# Patient Record
Sex: MALE | Race: BLACK OR AFRICAN AMERICAN | NOT HISPANIC OR LATINO | Employment: FULL TIME | URBAN - METROPOLITAN AREA
[De-identification: names, ages, dates, MRNs, and addresses within clinical notes are randomized per-mention and may not be internally consistent; named-entity substitution may affect disease eponyms.]

---

## 2021-06-15 ENCOUNTER — TELEPHONE (OUTPATIENT)
Dept: PSYCHIATRY | Facility: CLINIC | Age: 36
End: 2021-06-15

## 2021-06-15 ENCOUNTER — OFFICE VISIT (OUTPATIENT)
Dept: PRIMARY CARE CLINIC | Facility: CLINIC | Age: 36
End: 2021-06-15
Payer: MEDICAID

## 2021-06-15 VITALS
SYSTOLIC BLOOD PRESSURE: 118 MMHG | TEMPERATURE: 98 F | HEART RATE: 78 BPM | RESPIRATION RATE: 18 BRPM | DIASTOLIC BLOOD PRESSURE: 76 MMHG | WEIGHT: 258.94 LBS | OXYGEN SATURATION: 97 % | BODY MASS INDEX: 34.32 KG/M2 | HEIGHT: 73 IN

## 2021-06-15 DIAGNOSIS — Z23 NEED FOR VACCINATION: ICD-10-CM

## 2021-06-15 DIAGNOSIS — Z76.89 ENCOUNTER TO ESTABLISH CARE: Primary | ICD-10-CM

## 2021-06-15 DIAGNOSIS — Z11.4 SCREENING FOR HIV (HUMAN IMMUNODEFICIENCY VIRUS): ICD-10-CM

## 2021-06-15 DIAGNOSIS — Z13.220 SCREENING FOR HYPERLIPIDEMIA: ICD-10-CM

## 2021-06-15 DIAGNOSIS — Z11.59 NEED FOR HEPATITIS C SCREENING TEST: ICD-10-CM

## 2021-06-15 DIAGNOSIS — Z70.9 SEXUAL COUNSELING: ICD-10-CM

## 2021-06-15 PROCEDURE — 99215 OFFICE O/P EST HI 40 MIN: CPT | Mod: PBBFAC,PN,25 | Performed by: STUDENT IN AN ORGANIZED HEALTH CARE EDUCATION/TRAINING PROGRAM

## 2021-06-15 PROCEDURE — 99999 PR PBB SHADOW E&M-EST. PATIENT-LVL V: CPT | Mod: PBBFAC,,, | Performed by: STUDENT IN AN ORGANIZED HEALTH CARE EDUCATION/TRAINING PROGRAM

## 2021-06-15 PROCEDURE — 90471 IMMUNIZATION ADMIN: CPT | Mod: PBBFAC,PN

## 2021-06-15 PROCEDURE — 99203 PR OFFICE/OUTPT VISIT, NEW, LEVL III, 30-44 MIN: ICD-10-PCS | Mod: S$PBB,,, | Performed by: STUDENT IN AN ORGANIZED HEALTH CARE EDUCATION/TRAINING PROGRAM

## 2021-06-15 PROCEDURE — 99203 OFFICE O/P NEW LOW 30 MIN: CPT | Mod: S$PBB,,, | Performed by: STUDENT IN AN ORGANIZED HEALTH CARE EDUCATION/TRAINING PROGRAM

## 2021-06-15 PROCEDURE — 99999 PR PBB SHADOW E&M-EST. PATIENT-LVL V: ICD-10-PCS | Mod: PBBFAC,,, | Performed by: STUDENT IN AN ORGANIZED HEALTH CARE EDUCATION/TRAINING PROGRAM

## 2021-08-02 ENCOUNTER — TELEPHONE (OUTPATIENT)
Dept: ENDOCRINOLOGY | Facility: CLINIC | Age: 36
End: 2021-08-02

## 2021-08-02 ENCOUNTER — OFFICE VISIT (OUTPATIENT)
Dept: ENDOCRINOLOGY | Facility: CLINIC | Age: 36
End: 2021-08-02
Payer: MEDICAID

## 2021-08-02 DIAGNOSIS — Z70.9 SEXUAL COUNSELING: ICD-10-CM

## 2021-08-02 PROCEDURE — 99204 PR OFFICE/OUTPT VISIT, NEW, LEVL IV, 45-59 MIN: ICD-10-PCS | Mod: 95,KX,, | Performed by: INTERNAL MEDICINE

## 2021-08-02 PROCEDURE — 99204 OFFICE O/P NEW MOD 45 MIN: CPT | Mod: 95,KX,, | Performed by: INTERNAL MEDICINE

## 2021-08-02 RX ORDER — SPIRONOLACTONE 50 MG/1
50 TABLET, FILM COATED ORAL 2 TIMES DAILY
Qty: 60 TABLET | Refills: 5 | Status: SHIPPED | OUTPATIENT
Start: 2021-08-02 | End: 2021-10-08 | Stop reason: SDUPTHER

## 2021-08-02 RX ORDER — ESTRADIOL VALERATE 40 MG/ML
10 INJECTION INTRAMUSCULAR
Qty: 5 ML | Refills: 5 | Status: SHIPPED | OUTPATIENT
Start: 2021-08-02 | End: 2021-10-08 | Stop reason: SDUPTHER

## 2021-08-08 ENCOUNTER — PATIENT MESSAGE (OUTPATIENT)
Dept: ENDOCRINOLOGY | Facility: CLINIC | Age: 36
End: 2021-08-08

## 2021-08-10 RX ORDER — SILDENAFIL 50 MG/1
50 TABLET, FILM COATED ORAL
Qty: 10 TABLET | Refills: 1 | Status: SHIPPED | OUTPATIENT
Start: 2021-08-10 | End: 2022-08-10

## 2021-08-11 ENCOUNTER — TELEPHONE (OUTPATIENT)
Dept: ENDOCRINOLOGY | Facility: CLINIC | Age: 36
End: 2021-08-11

## 2021-08-13 ENCOUNTER — TELEPHONE (OUTPATIENT)
Dept: ENDOCRINOLOGY | Facility: CLINIC | Age: 36
End: 2021-08-13

## 2021-10-11 RX ORDER — ESTRADIOL VALERATE 40 MG/ML
10 INJECTION INTRAMUSCULAR
Qty: 5 ML | Refills: 5 | Status: SHIPPED | OUTPATIENT
Start: 2021-10-11 | End: 2021-12-12 | Stop reason: SDUPTHER

## 2021-10-11 RX ORDER — SPIRONOLACTONE 50 MG/1
50 TABLET, FILM COATED ORAL 2 TIMES DAILY
Qty: 60 TABLET | Refills: 5 | Status: SHIPPED | OUTPATIENT
Start: 2021-10-11 | End: 2021-12-12 | Stop reason: SDUPTHER

## 2021-12-13 RX ORDER — ESTRADIOL VALERATE 40 MG/ML
10 INJECTION INTRAMUSCULAR
Qty: 5 ML | Refills: 5 | Status: SHIPPED | OUTPATIENT
Start: 2021-12-13 | End: 2022-05-26 | Stop reason: SDUPTHER

## 2021-12-13 RX ORDER — SPIRONOLACTONE 50 MG/1
50 TABLET, FILM COATED ORAL 2 TIMES DAILY
Qty: 60 TABLET | Refills: 5 | Status: SHIPPED | OUTPATIENT
Start: 2021-12-13 | End: 2022-05-26 | Stop reason: SDUPTHER

## 2022-05-26 RX ORDER — SPIRONOLACTONE 50 MG/1
50 TABLET, FILM COATED ORAL 2 TIMES DAILY
Qty: 60 TABLET | Refills: 3 | Status: SHIPPED | OUTPATIENT
Start: 2022-05-26 | End: 2022-05-30 | Stop reason: SDUPTHER

## 2022-05-26 RX ORDER — ESTRADIOL VALERATE 40 MG/ML
10 INJECTION INTRAMUSCULAR
Qty: 5 ML | Refills: 3 | Status: SHIPPED | OUTPATIENT
Start: 2022-05-26 | End: 2022-05-30 | Stop reason: SDUPTHER

## 2022-05-26 RX ORDER — ESTRADIOL VALERATE 40 MG/ML
10 INJECTION INTRAMUSCULAR
Qty: 5 ML | Refills: 3 | Status: SHIPPED | OUTPATIENT
Start: 2022-05-26 | End: 2022-05-26 | Stop reason: SDUPTHER

## 2022-05-26 RX ORDER — SPIRONOLACTONE 50 MG/1
50 TABLET, FILM COATED ORAL 2 TIMES DAILY
Qty: 60 TABLET | Refills: 3 | Status: SHIPPED | OUTPATIENT
Start: 2022-05-26 | End: 2022-05-26 | Stop reason: SDUPTHER

## 2022-05-28 ENCOUNTER — PATIENT MESSAGE (OUTPATIENT)
Dept: ENDOCRINOLOGY | Facility: CLINIC | Age: 37
End: 2022-05-28
Payer: MEDICAID

## 2022-05-30 ENCOUNTER — PATIENT MESSAGE (OUTPATIENT)
Dept: ENDOCRINOLOGY | Facility: CLINIC | Age: 37
End: 2022-05-30
Payer: MEDICAID

## 2022-05-30 RX ORDER — SPIRONOLACTONE 50 MG/1
50 TABLET, FILM COATED ORAL 2 TIMES DAILY
Qty: 180 TABLET | Refills: 0 | Status: SHIPPED | OUTPATIENT
Start: 2022-05-30 | End: 2022-09-06

## 2022-05-30 RX ORDER — ESTRADIOL VALERATE 40 MG/ML
10 INJECTION INTRAMUSCULAR
Qty: 5 ML | Refills: 1 | Status: SHIPPED | OUTPATIENT
Start: 2022-05-30 | End: 2023-02-08 | Stop reason: SDUPTHER

## 2022-06-03 NOTE — TELEPHONE ENCOUNTER
----- Message from Aylin Al sent at 6/3/2022  2:46 PM CDT -----  Regarding: Refill  Contact: pt @[phone]  estradiol valerate (DELESTROGEN) 40 mg/mL injection    spironolactone (ALDACTONE) 50 MG tablet    Pt checking to see if office refilled medication, asking for a call back

## 2022-06-04 RX ORDER — SPIRONOLACTONE 50 MG/1
50 TABLET, FILM COATED ORAL 2 TIMES DAILY
Qty: 180 TABLET | Refills: 0 | OUTPATIENT
Start: 2022-06-04

## 2022-06-04 RX ORDER — ESTRADIOL VALERATE 40 MG/ML
10 INJECTION INTRAMUSCULAR
Qty: 5 ML | Refills: 0 | OUTPATIENT
Start: 2022-06-04 | End: 2023-06-04

## 2022-07-21 ENCOUNTER — PATIENT MESSAGE (OUTPATIENT)
Dept: ENDOCRINOLOGY | Facility: CLINIC | Age: 37
End: 2022-07-21
Payer: MEDICAID

## 2022-07-22 ENCOUNTER — OFFICE VISIT (OUTPATIENT)
Dept: ENDOCRINOLOGY | Facility: CLINIC | Age: 37
End: 2022-07-22
Attending: INTERNAL MEDICINE
Payer: MEDICAID

## 2022-07-22 PROCEDURE — 99214 OFFICE O/P EST MOD 30 MIN: CPT | Mod: 95,KX,, | Performed by: INTERNAL MEDICINE

## 2022-07-22 PROCEDURE — 1160F RVW MEDS BY RX/DR IN RCRD: CPT | Mod: CPTII,95,, | Performed by: INTERNAL MEDICINE

## 2022-07-22 PROCEDURE — 1159F MED LIST DOCD IN RCRD: CPT | Mod: CPTII,95,, | Performed by: INTERNAL MEDICINE

## 2022-07-22 PROCEDURE — 99214 PR OFFICE/OUTPT VISIT, EST, LEVL IV, 30-39 MIN: ICD-10-PCS | Mod: 95,KX,, | Performed by: INTERNAL MEDICINE

## 2022-07-22 PROCEDURE — 1159F PR MEDICATION LIST DOCUMENTED IN MEDICAL RECORD: ICD-10-PCS | Mod: CPTII,95,, | Performed by: INTERNAL MEDICINE

## 2022-07-22 PROCEDURE — 1160F PR REVIEW ALL MEDS BY PRESCRIBER/CLIN PHARMACIST DOCUMENTED: ICD-10-PCS | Mod: CPTII,95,, | Performed by: INTERNAL MEDICINE

## 2022-07-22 NOTE — PATIENT INSTRUCTIONS
Thank you for completing a virtual visit with me!     Per our conversation:   Continue with your current regimen.  My nurse will reach out to arrange for blood work to be done in 3 months and a visit after.  Remember to have the blood work done 1-2 days prior to your injection     Please let me know if you have any other questions.    Thank you,  Reba Stanley MD

## 2022-07-22 NOTE — ASSESSMENT & PLAN NOTE
Transwoman: gender incongruence   Reviewed therapy, side effects (both wanted and unwanted), possible adverse outcomes, expectations, compliance. Reviewed limited data on fertility, expect decline over time     Patient encouraged to  Work with a therapist during the transition process.          Since she just restarted her regimen.... RTO in 3 months with labs with labs prior.    Healthy lifestyle stressed  Discussed increased risk of dvt   Avoid prolonged immobility  D/c ert prior to any procedures

## 2022-07-22 NOTE — PROGRESS NOTES
Subjective:      Patient ID: Charly Anderson is a 36 y.o. male.    Chief Complaint:  Gender     History of Present Illness  With regards to her gender incongruence care:    Gender identity - female  Current Therapy / counselor - none   Therapist at the time hormones were started:  None        Gender Surgeries - none, but interested contouring surgeries (fat transfer)        Fertility concerns - not  interested    Was  using her friend's   hormone therapy and I started therapy in August of 2022:  Current regimen   Estradiol valerate  10 mg q 1 week - fridays     aldactone 50 mg bid        Of note, she moved to [place] but is now back.  She was offer hormones for a little while.  She has been back on hormones for 2 weeks.      viagra prn - didn't use it     Getting breast development   She is happy she is doing this   She feels much better on hormones then not    Social:  Work -looking for a job   Family -  Not aware   Single - attracted to women - erections are not important   Housing - lives alone       no dx of Anxiety or depression    New concerns today:   None        With regards to obesity,     Denies symptoms of hyperglycemia such as polyuria, polydipsia, nocturia, unexplained weight loss or blurred vision      ROS:   As above    Objective:     There were no vitals taken for this visit.  BP Readings from Last 3 Encounters:   06/15/21 118/76     Wt Readings from Last 1 Encounters:   06/15/21 0925 117.4 kg (258 lb 14.9 oz)     There is no height or weight on file to calculate BMI.        Physical Exam  Constitutional:       Appearance: Normal appearance.   Psychiatric:         Mood and Affect: Mood normal.         Behavior: Behavior normal.         Thought Content: Thought content normal.         Judgment: Judgment normal.                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 Reviewed limited data on fertility, expect decline over time     Patient encouraged to  Work with a therapist during the transition process.          Since she just restarted her regimen.... RTO in 3 months with labs with labs prior.    Healthy lifestyle stressed  Discussed increased risk of dvt   Avoid prolonged immobility  D/c ert prior to any procedures         BMI 34.0-34.9,adult  Check glucose lipids and A1c  Follow over time  There is increased risk for developing diabetes      The patient location is: home  The chief complaint leading to consultation is: gender    Visit type: audiovisual    Face to Face time with patient: 25 minutes of total time spent on the encounter, which includes face to face time and non-face to face time preparing to see the patient (eg, review of tests), Obtaining and/or reviewing separately obtained history, Documenting clinical information in the electronic or other health record, Independently interpreting results (not separately reported) and communicating results to the patient/family/caregiver, or Care coordination (not separately reported).         Each patient to whom he or she provides medical services by telemedicine is:  (1) informed of the relationship between the physician and patient and the respective role of any other health care provider with respect to management of the patient; and (2) notified that he or she may decline to receive medical services by telemedicine and may withdraw from such care at any time.

## 2022-11-15 ENCOUNTER — TELEPHONE (OUTPATIENT)
Dept: ENDOCRINOLOGY | Facility: CLINIC | Age: 37
End: 2022-11-15
Payer: MEDICAID

## 2022-11-15 NOTE — TELEPHONE ENCOUNTER
Patient wants to discuss getting breast implants. Pt has medicaid and wants to know the process needed to get surgery.      ----- Message from Annabella Holloway sent at 11/15/2022  2:21 PM CST -----  Regarding: Pt Advice  Pt is requesting a call back regarding Breast Implants Discussion   Please call to discuss further.      [phone]

## 2022-11-16 NOTE — TELEPHONE ENCOUNTER
Informed pt what Dr. Stanley stated about letter of support and informed pt that she needs to speak to surgeons to see if they accept insurance.

## 2023-01-19 ENCOUNTER — TELEPHONE (OUTPATIENT)
Dept: ENDOCRINOLOGY | Facility: CLINIC | Age: 38
End: 2023-01-19
Payer: MEDICAID

## 2023-01-19 NOTE — TELEPHONE ENCOUNTER
----- Message from Ghada Saucedo sent at 1/19/2023  3:52 PM CST -----  Contact: pt  Pt is requesting to schedule an appt for a check up and also to see about increasing the hormones that are being taken. No appts populate in the system for me to assist with scheduling appt.     Confirmed contact below:  Contact Name:Charly Anderson  Phone Number: [phone]

## 2023-01-19 NOTE — TELEPHONE ENCOUNTER
Attempted to call the patient to get scheduled for an appt. No response. Called patient x2. Message sent to supervisor to get patient an appt sooner than May 2023

## 2023-01-20 ENCOUNTER — PATIENT MESSAGE (OUTPATIENT)
Dept: ENDOCRINOLOGY | Facility: CLINIC | Age: 38
End: 2023-01-20
Payer: MEDICAID

## 2023-01-20 NOTE — TELEPHONE ENCOUNTER
Attempted to call patient again. No answer. Patient portal message sent informing pt of the scheduled appointment and asking for confirmation if the appointment date and time will work for pt's schedule.

## 2023-02-03 DIAGNOSIS — Z78.9 TRANSGENDER: ICD-10-CM

## 2023-02-03 RX ORDER — SPIRONOLACTONE 50 MG/1
50 TABLET, FILM COATED ORAL 2 TIMES DAILY
Qty: 180 TABLET | Refills: 0 | Status: SHIPPED | OUTPATIENT
Start: 2023-02-03 | End: 2023-02-08 | Stop reason: SDUPTHER

## 2023-02-03 NOTE — TELEPHONE ENCOUNTER
----- Message from Annabella Holloway sent at 2/3/2023  3:13 PM CST -----  Regarding: Medication  Contact: Pt@[phone]  Pt is Requesting a call back regarding Medication Refill Request  spironolactone (ALDACTONE) 50 MG tablet,please call to discuss further.                [facility] [id number] [facility] [place], [place] - [id number] [place] [id number]  Phone: [phone] Fax: [phone]

## 2023-02-08 ENCOUNTER — PATIENT MESSAGE (OUTPATIENT)
Dept: ENDOCRINOLOGY | Facility: CLINIC | Age: 38
End: 2023-02-08
Payer: MEDICAID

## 2023-02-08 DIAGNOSIS — Z78.9 TRANSGENDER: ICD-10-CM

## 2023-02-08 RX ORDER — ESTRADIOL VALERATE 40 MG/ML
10 INJECTION INTRAMUSCULAR
Qty: 5 ML | Refills: 1 | Status: SHIPPED | OUTPATIENT
Start: 2023-02-08 | End: 2023-05-17 | Stop reason: SDUPTHER

## 2023-02-08 RX ORDER — SPIRONOLACTONE 50 MG/1
50 TABLET, FILM COATED ORAL 2 TIMES DAILY
Qty: 180 TABLET | Refills: 0 | Status: SHIPPED | OUTPATIENT
Start: 2023-02-08 | End: 2023-05-17 | Stop reason: SDUPTHER

## 2023-02-28 ENCOUNTER — PATIENT MESSAGE (OUTPATIENT)
Dept: ENDOCRINOLOGY | Facility: CLINIC | Age: 38
End: 2023-02-28
Payer: MEDICAID

## 2023-02-28 RX ORDER — NEEDLES, SAFETY 22GX1 1/2"
NEEDLE, DISPOSABLE MISCELLANEOUS
Qty: 100 EACH | Refills: 5 | Status: SHIPPED | OUTPATIENT
Start: 2023-02-28 | End: 2023-03-03 | Stop reason: SDUPTHER

## 2023-02-28 RX ORDER — NEEDLES, DISPOSABLE 27GX1/2"
NEEDLE, DISPOSABLE MISCELLANEOUS
Qty: 100 EACH | Refills: 1 | Status: SHIPPED | OUTPATIENT
Start: 2023-02-28 | End: 2023-03-03 | Stop reason: SDUPTHER

## 2023-03-02 ENCOUNTER — PATIENT MESSAGE (OUTPATIENT)
Dept: ENDOCRINOLOGY | Facility: CLINIC | Age: 38
End: 2023-03-02
Payer: MEDICAID

## 2023-03-02 DIAGNOSIS — Z78.9 TRANSGENDER: Primary | ICD-10-CM

## 2023-03-03 RX ORDER — NEEDLES, SAFETY 22GX1 1/2"
NEEDLE, DISPOSABLE MISCELLANEOUS
Qty: 100 EACH | Refills: 5 | Status: SHIPPED | OUTPATIENT
Start: 2023-03-03 | End: 2023-05-17 | Stop reason: SDUPTHER

## 2023-03-03 RX ORDER — NEEDLES, DISPOSABLE 27GX1/2"
NEEDLE, DISPOSABLE MISCELLANEOUS
Qty: 100 EACH | Refills: 1 | Status: SHIPPED | OUTPATIENT
Start: 2023-03-03 | End: 2023-05-17 | Stop reason: SDUPTHER

## 2023-04-18 ENCOUNTER — PATIENT MESSAGE (OUTPATIENT)
Dept: ENDOCRINOLOGY | Facility: CLINIC | Age: 38
End: 2023-04-18
Payer: MEDICAID

## 2023-05-17 ENCOUNTER — PATIENT MESSAGE (OUTPATIENT)
Dept: ENDOCRINOLOGY | Facility: CLINIC | Age: 38
End: 2023-05-17
Payer: MEDICAID

## 2023-05-17 DIAGNOSIS — Z78.9 TRANSGENDER: ICD-10-CM

## 2023-05-19 RX ORDER — NEEDLES, SAFETY 22GX1 1/2"
NEEDLE, DISPOSABLE MISCELLANEOUS
Qty: 100 EACH | Refills: 5 | Status: SHIPPED | OUTPATIENT
Start: 2023-05-19 | End: 2024-01-03 | Stop reason: SDUPTHER

## 2023-05-19 RX ORDER — SPIRONOLACTONE 50 MG/1
50 TABLET, FILM COATED ORAL 2 TIMES DAILY
Qty: 180 TABLET | Refills: 0 | Status: SHIPPED | OUTPATIENT
Start: 2023-05-19 | End: 2023-09-28 | Stop reason: SDUPTHER

## 2023-05-19 RX ORDER — NEEDLES, DISPOSABLE 27GX1/2"
NEEDLE, DISPOSABLE MISCELLANEOUS
Qty: 100 EACH | Refills: 1 | Status: SHIPPED | OUTPATIENT
Start: 2023-05-19

## 2023-05-19 RX ORDER — ESTRADIOL VALERATE 40 MG/ML
10 INJECTION INTRAMUSCULAR
Qty: 5 ML | Refills: 1 | Status: SHIPPED | OUTPATIENT
Start: 2023-05-19 | End: 2023-09-28 | Stop reason: SDUPTHER

## 2023-09-28 ENCOUNTER — OFFICE VISIT (OUTPATIENT)
Dept: ENDOCRINOLOGY | Facility: CLINIC | Age: 38
End: 2023-09-28
Payer: MEDICAID

## 2023-09-28 DIAGNOSIS — F64.0 TRANSGENDER WOMAN ON HORMONE THERAPY: Primary | ICD-10-CM

## 2023-09-28 DIAGNOSIS — Z78.9 TRANSGENDER: ICD-10-CM

## 2023-09-28 DIAGNOSIS — Z79.899 TRANSGENDER WOMAN ON HORMONE THERAPY: Primary | ICD-10-CM

## 2023-09-28 PROCEDURE — 1160F PR REVIEW ALL MEDS BY PRESCRIBER/CLIN PHARMACIST DOCUMENTED: ICD-10-PCS | Mod: CPTII,95,, | Performed by: INTERNAL MEDICINE

## 2023-09-28 PROCEDURE — 1159F PR MEDICATION LIST DOCUMENTED IN MEDICAL RECORD: ICD-10-PCS | Mod: CPTII,95,, | Performed by: INTERNAL MEDICINE

## 2023-09-28 PROCEDURE — 99214 PR OFFICE/OUTPT VISIT, EST, LEVL IV, 30-39 MIN: ICD-10-PCS | Mod: 95,,, | Performed by: INTERNAL MEDICINE

## 2023-09-28 PROCEDURE — 99214 OFFICE O/P EST MOD 30 MIN: CPT | Mod: 95,,, | Performed by: INTERNAL MEDICINE

## 2023-09-28 PROCEDURE — 1160F RVW MEDS BY RX/DR IN RCRD: CPT | Mod: CPTII,95,, | Performed by: INTERNAL MEDICINE

## 2023-09-28 PROCEDURE — 1159F MED LIST DOCD IN RCRD: CPT | Mod: CPTII,95,, | Performed by: INTERNAL MEDICINE

## 2023-09-28 RX ORDER — SPIRONOLACTONE 50 MG/1
50 TABLET, FILM COATED ORAL 2 TIMES DAILY
Qty: 180 TABLET | Refills: 3 | Status: SHIPPED | OUTPATIENT
Start: 2023-09-28 | End: 2024-01-03 | Stop reason: SDUPTHER

## 2023-09-28 RX ORDER — ESTRADIOL VALERATE 40 MG/ML
10 INJECTION INTRAMUSCULAR
Qty: 5 ML | Refills: 1 | Status: SHIPPED | OUTPATIENT
Start: 2023-09-28 | End: 2024-01-03 | Stop reason: SDUPTHER

## 2023-09-28 NOTE — ASSESSMENT & PLAN NOTE
Transwoman: gender incongruence   Reviewed therapy, side effects (both wanted and unwanted), possible adverse outcomes, expectations, compliance. Reviewed limited data on fertility, expect decline over time     Patient encouraged to  Work with a therapist during the transition process.           restart estrogen, labs in a few weeks.    If okay return to clinic yearly    Healthy lifestyle stressed  Discussed increased risk of dvt   Avoid prolonged immobility  D/c ert prior to any procedures

## 2023-09-28 NOTE — Clinical Note
Fasting labs soon.  Please have labs done 1-2 days prior to estrogen injection.   Return to clinic in a year

## 2023-09-28 NOTE — PATIENT INSTRUCTIONS
Thank you for completing a virtual visit with me!     Per our conversation,  I will send in a new prescriptions for spironolactone and estrogen.  My nurse will reach out to arrange for fasting blood work to be done soon.  Remember to have the labs drawn 1-2 days prior to your estrogen injection.        If all looks okay, we will see you back in a year       Please let me know if you have any other questions.    Thank you,  Reba Stanley MD

## 2023-09-28 NOTE — PROGRESS NOTES
"Subjective:      Patient ID: Charly Anderson is a 37 y.o. adult.    Chief Complaint:  Gender     History of Present Illness  With regards to her gender incongruence care:    Gender identity - female  Current Therapy / counselor - none   Therapist at the time hormones were started:  None        Gender Surgeries - none, but interested contouring surgeries (fat transfer)    BA in [place] in the next few months        Fertility concerns - not  interested    Was  using her friend's   hormone therapy and I started therapy in August of 2022:  Current regimen   Estradiol valerate  10 mg q 1 week - - out for a month    aldactone 50 mg bid         Getting breast development   She is happy she is doing this   She feels much better on hormones then not    Social:  Work -   Family -  Not aware   Single - attracted to women / transmen - erections are not important   Housing - lives alone       no dx of Anxiety or depression    New concerns today:   None        With regards to obesity,   and low hdl  Denies symptoms of hyperglycemia such as polyuria, polydipsia, nocturia, unexplained weight loss or blurred vision    Wt 285   6"1'      ROS:   As above    Objective:     There were no vitals taken for this visit.  BP Readings from Last 3 Encounters:   06/15/21 118/76     Wt Readings from Last 1 Encounters:   06/15/21 0925 117.4 kg (258 lb 14.9 oz)     There is no height or weight on file to calculate BMI.        Physical Exam  Constitutional:       Appearance: Normal appearance.   Psychiatric:         Mood and Affect: Mood normal.         Behavior: Behavior normal.         Thought Content: Thought content normal.         Judgment: Judgment normal.                Lab Review:   No results found for: "HGBA1C"  Lab Results   Component Value Date    CHOL 146 06/15/2021    HDL 36 (L) 06/15/2021    LDLCALC 95.8 06/15/2021    TRIG 71 06/15/2021    CHOLHDL 24.7 06/15/2021     No results found for: "NA", "K", "CL", "CO2", " ""GLU", "BUN", "CREATININE", "CALCIUM", "PROT", "ALBUMIN", "BILITOT", "ALKPHOS", "AST", "ALT", "ANIONGAP", "ESTGFRAFRICA", "EGFRNONAA", "TSH"  No results found for: "HBXOITYE10ED"    Assessment and Plan     Transgender woman on hormone therapy  Transwoman: gender incongruence   Reviewed therapy, side effects (both wanted and unwanted), possible adverse outcomes, expectations, compliance. Reviewed limited data on fertility, expect decline over time     Patient encouraged to  Work with a therapist during the transition process.           restart estrogen, labs in a few weeks.    If okay return to clinic yearly    Healthy lifestyle stressed  Discussed increased risk of dvt   Avoid prolonged immobility  D/c ert prior to any procedures         BMI 34.0-34.9,adult  Check glucose lipids and A1c  Follow over time  There is increased risk for developing diabetes      The patient location is: home  The chief complaint leading to consultation is: gender    Visit type: audiovisual    Face to Face time with patient: 25 minutes of total time spent on the encounter, which includes face to face time and non-face to face time preparing to see the patient (eg, review of tests), Obtaining and/or reviewing separately obtained history, Documenting clinical information in the electronic or other health record, Independently interpreting results (not separately reported) and communicating results to the patient/family/caregiver, or Care coordination (not separately reported).         Each patient to whom he or she provides medical services by telemedicine is:  (1) informed of the relationship between the physician and patient and the respective role of any other health care provider with respect to management of the patient; and (2) notified that he or she may decline to receive medical services by telemedicine and may withdraw from such care at any time.     "

## 2023-10-04 ENCOUNTER — LAB VISIT (OUTPATIENT)
Dept: LAB | Facility: OTHER | Age: 38
End: 2023-10-04
Attending: INTERNAL MEDICINE
Payer: MEDICAID

## 2023-10-04 DIAGNOSIS — F64.0 TRANSGENDER WOMAN ON HORMONE THERAPY: ICD-10-CM

## 2023-10-04 DIAGNOSIS — Z79.899 TRANSGENDER WOMAN ON HORMONE THERAPY: ICD-10-CM

## 2023-10-04 LAB
25(OH)D3+25(OH)D2 SERPL-MCNC: 6 NG/ML (ref 30–96)
ALBUMIN SERPL BCP-MCNC: 4.3 G/DL (ref 3.5–5.2)
ALP SERPL-CCNC: 69 U/L (ref 55–135)
ALT SERPL W/O P-5'-P-CCNC: 19 U/L (ref 10–44)
ANION GAP SERPL CALC-SCNC: 9 MMOL/L (ref 8–16)
AST SERPL-CCNC: 18 U/L (ref 10–40)
BILIRUB SERPL-MCNC: 0.2 MG/DL (ref 0.1–1)
BUN SERPL-MCNC: 12 MG/DL (ref 6–20)
CALCIUM SERPL-MCNC: 9.8 MG/DL (ref 8.7–10.5)
CHLORIDE SERPL-SCNC: 107 MMOL/L (ref 95–110)
CHOLEST SERPL-MCNC: 145 MG/DL (ref 120–199)
CHOLEST/HDLC SERPL: 4.4 {RATIO} (ref 2–5)
CO2 SERPL-SCNC: 23 MMOL/L (ref 23–29)
CREAT SERPL-MCNC: 1.3 MG/DL (ref 0.5–1.4)
EST. GFR  (NO RACE VARIABLE): >60 ML/MIN/1.73 M^2
ESTIMATED AVG GLUCOSE: 117 MG/DL (ref 68–131)
ESTRADIOL SERPL-MCNC: 35 PG/ML (ref 11–44)
GLUCOSE SERPL-MCNC: 104 MG/DL (ref 70–110)
HBA1C MFR BLD: 5.7 % (ref 4–5.6)
HDLC SERPL-MCNC: 33 MG/DL (ref 40–75)
HDLC SERPL: 22.8 % (ref 20–50)
LDLC SERPL CALC-MCNC: 91 MG/DL (ref 63–159)
NONHDLC SERPL-MCNC: 112 MG/DL
POTASSIUM SERPL-SCNC: 4.2 MMOL/L (ref 3.5–5.1)
PROT SERPL-MCNC: 8.2 G/DL (ref 6–8.4)
SODIUM SERPL-SCNC: 139 MMOL/L (ref 136–145)
TESTOST SERPL-MCNC: 348 NG/DL (ref 304–1227)
TRIGL SERPL-MCNC: 105 MG/DL (ref 30–150)
TSH SERPL DL<=0.005 MIU/L-ACNC: 0.63 UIU/ML (ref 0.4–4)

## 2023-10-04 PROCEDURE — 83036 HEMOGLOBIN GLYCOSYLATED A1C: CPT | Performed by: INTERNAL MEDICINE

## 2023-10-04 PROCEDURE — 84403 ASSAY OF TOTAL TESTOSTERONE: CPT | Performed by: INTERNAL MEDICINE

## 2023-10-04 PROCEDURE — 36415 COLL VENOUS BLD VENIPUNCTURE: CPT | Performed by: INTERNAL MEDICINE

## 2023-10-04 PROCEDURE — 82306 VITAMIN D 25 HYDROXY: CPT | Performed by: INTERNAL MEDICINE

## 2023-10-04 PROCEDURE — 80061 LIPID PANEL: CPT | Performed by: INTERNAL MEDICINE

## 2023-10-04 PROCEDURE — 82670 ASSAY OF TOTAL ESTRADIOL: CPT | Performed by: INTERNAL MEDICINE

## 2023-10-04 PROCEDURE — 80053 COMPREHEN METABOLIC PANEL: CPT | Performed by: INTERNAL MEDICINE

## 2023-10-04 PROCEDURE — 84443 ASSAY THYROID STIM HORMONE: CPT | Performed by: INTERNAL MEDICINE

## 2023-10-10 ENCOUNTER — PATIENT MESSAGE (OUTPATIENT)
Dept: ENDOCRINOLOGY | Facility: CLINIC | Age: 38
End: 2023-10-10
Payer: MEDICAID

## 2023-10-10 DIAGNOSIS — R73.02 IGT (IMPAIRED GLUCOSE TOLERANCE): ICD-10-CM

## 2023-10-10 DIAGNOSIS — E55.9 VITAMIN D DEFICIENCY: ICD-10-CM

## 2023-10-10 DIAGNOSIS — E78.6 LOW HDL (UNDER 40): ICD-10-CM

## 2023-10-10 DIAGNOSIS — Z79.899 TRANSGENDER WOMAN ON HORMONE THERAPY: Primary | ICD-10-CM

## 2023-10-10 DIAGNOSIS — F64.0 TRANSGENDER WOMAN ON HORMONE THERAPY: Primary | ICD-10-CM

## 2024-01-03 DIAGNOSIS — Z78.9 TRANSGENDER: Primary | ICD-10-CM

## 2024-01-04 RX ORDER — SPIRONOLACTONE 50 MG/1
50 TABLET, FILM COATED ORAL 2 TIMES DAILY
Qty: 180 TABLET | Refills: 3 | Status: SHIPPED | OUTPATIENT
Start: 2024-01-04

## 2024-01-04 RX ORDER — ESTRADIOL VALERATE 40 MG/ML
10 INJECTION INTRAMUSCULAR
Qty: 5 ML | Refills: 1 | Status: SHIPPED | OUTPATIENT
Start: 2024-01-04 | End: 2025-01-03

## 2024-01-04 RX ORDER — NEEDLES, SAFETY 22GX1 1/2"
NEEDLE, DISPOSABLE MISCELLANEOUS
Qty: 100 EACH | Refills: 5 | Status: SHIPPED | OUTPATIENT
Start: 2024-01-04

## 2024-04-03 ENCOUNTER — PATIENT MESSAGE (OUTPATIENT)
Dept: RESEARCH | Facility: CLINIC | Age: 39
End: 2024-04-03
Payer: MEDICAID

## 2024-04-04 ENCOUNTER — PATIENT MESSAGE (OUTPATIENT)
Dept: RESEARCH | Facility: CLINIC | Age: 39
End: 2024-04-04
Payer: MEDICAID

## 2024-04-22 ENCOUNTER — PATIENT MESSAGE (OUTPATIENT)
Dept: RESEARCH | Facility: CLINIC | Age: 39
End: 2024-04-22
Payer: MEDICAID

## 2024-04-25 ENCOUNTER — PATIENT OUTREACH (OUTPATIENT)
Dept: ADMINISTRATIVE | Facility: HOSPITAL | Age: 39
End: 2024-04-25
Payer: MEDICAID

## 2024-04-25 ENCOUNTER — PATIENT MESSAGE (OUTPATIENT)
Dept: ADMINISTRATIVE | Facility: HOSPITAL | Age: 39
End: 2024-04-25
Payer: MEDICAID

## 2024-04-25 NOTE — PROGRESS NOTES
Health Maintenance Due   Topic Date Due    Influenza Vaccine (1) Never done    COVID-19 Vaccine (1 - 2023-24 season) Never done     Immunizations - reviewed and updated   Care Everywhere - triggered   Care Teams - updated   Outreach - [facility] panel list reviewed. Patient due for annual visit with PCP. Patient last seen on 6/15/2021 to establish care. Portal message sent in regards to scheduling

## 2024-06-04 DIAGNOSIS — Z78.9 TRANSGENDER: ICD-10-CM

## 2024-06-05 RX ORDER — SPIRONOLACTONE 50 MG/1
50 TABLET, FILM COATED ORAL 2 TIMES DAILY
Qty: 180 TABLET | Refills: 0 | Status: SHIPPED | OUTPATIENT
Start: 2024-06-05

## 2024-06-05 RX ORDER — ESTRADIOL VALERATE 40 MG/ML
10 INJECTION INTRAMUSCULAR
Qty: 5 ML | Refills: 1 | Status: SHIPPED | OUTPATIENT
Start: 2024-06-05 | End: 2025-06-05

## 2024-06-27 ENCOUNTER — PATIENT OUTREACH (OUTPATIENT)
Dept: ADMINISTRATIVE | Facility: HOSPITAL | Age: 39
End: 2024-06-27
Payer: MEDICAID

## 2024-06-27 NOTE — PROGRESS NOTES
[facility] panel list reviewed. Patient not seen since 6/15/2021. Patient would need to establish care with a new provider    Dr. Elizalde removed as PCP

## 2024-07-08 ENCOUNTER — PATIENT MESSAGE (OUTPATIENT)
Dept: ENDOCRINOLOGY | Facility: CLINIC | Age: 39
End: 2024-07-08
Payer: MEDICAID

## 2024-07-17 ENCOUNTER — PATIENT MESSAGE (OUTPATIENT)
Dept: ENDOCRINOLOGY | Facility: CLINIC | Age: 39
End: 2024-07-17
Payer: MEDICAID

## 2024-07-17 DIAGNOSIS — Z79.899 TRANSGENDER WOMAN ON HORMONE THERAPY: Primary | ICD-10-CM

## 2024-07-17 DIAGNOSIS — E55.9 VITAMIN D DEFICIENCY: ICD-10-CM

## 2024-07-17 DIAGNOSIS — R73.02 IGT (IMPAIRED GLUCOSE TOLERANCE): ICD-10-CM

## 2024-07-17 DIAGNOSIS — F64.0 TRANSGENDER WOMAN ON HORMONE THERAPY: Primary | ICD-10-CM

## 2024-08-08 ENCOUNTER — PATIENT MESSAGE (OUTPATIENT)
Dept: ENDOCRINOLOGY | Facility: CLINIC | Age: 39
End: 2024-08-08
Payer: MEDICAID

## 2024-08-08 DIAGNOSIS — Z79.899 TRANSGENDER WOMAN ON HORMONE THERAPY: Primary | ICD-10-CM

## 2024-08-08 DIAGNOSIS — F64.0 TRANSGENDER WOMAN ON HORMONE THERAPY: Primary | ICD-10-CM

## 2024-08-08 DIAGNOSIS — R73.02 IGT (IMPAIRED GLUCOSE TOLERANCE): ICD-10-CM

## 2024-08-26 ENCOUNTER — PATIENT MESSAGE (OUTPATIENT)
Dept: ENDOCRINOLOGY | Facility: CLINIC | Age: 39
End: 2024-08-26
Payer: MEDICAID

## 2024-09-04 ENCOUNTER — PATIENT MESSAGE (OUTPATIENT)
Dept: ENDOCRINOLOGY | Facility: CLINIC | Age: 39
End: 2024-09-04
Payer: MEDICAID

## 2024-09-19 ENCOUNTER — PATIENT MESSAGE (OUTPATIENT)
Dept: PRIMARY CARE CLINIC | Facility: CLINIC | Age: 39
End: 2024-09-19
Payer: MEDICAID

## 2024-10-09 ENCOUNTER — PATIENT MESSAGE (OUTPATIENT)
Dept: ENDOCRINOLOGY | Facility: CLINIC | Age: 39
End: 2024-10-09
Payer: MEDICAID

## 2024-11-24 DIAGNOSIS — Z78.9 TRANSGENDER: ICD-10-CM

## 2024-11-25 RX ORDER — ESTRADIOL VALERATE 40 MG/ML
10 INJECTION INTRAMUSCULAR
Qty: 5 ML | Refills: 1 | Status: SHIPPED | OUTPATIENT
Start: 2024-11-25 | End: 2025-11-25

## 2024-11-25 RX ORDER — SPIRONOLACTONE 50 MG/1
50 TABLET, FILM COATED ORAL 2 TIMES DAILY
Qty: 180 TABLET | Refills: 0 | Status: SHIPPED | OUTPATIENT
Start: 2024-11-25

## 2025-08-05 ENCOUNTER — PATIENT MESSAGE (OUTPATIENT)
Dept: ENDOCRINOLOGY | Facility: CLINIC | Age: 40
End: 2025-08-05
Payer: MEDICAID